# Patient Record
Sex: FEMALE | Race: OTHER | HISPANIC OR LATINO | Employment: FULL TIME | URBAN - METROPOLITAN AREA
[De-identification: names, ages, dates, MRNs, and addresses within clinical notes are randomized per-mention and may not be internally consistent; named-entity substitution may affect disease eponyms.]

---

## 2020-06-12 ENCOUNTER — OFFICE VISIT (OUTPATIENT)
Dept: OBGYN CLINIC | Facility: CLINIC | Age: 30
End: 2020-06-12

## 2020-06-12 VITALS
WEIGHT: 110 LBS | TEMPERATURE: 98 F | SYSTOLIC BLOOD PRESSURE: 109 MMHG | DIASTOLIC BLOOD PRESSURE: 70 MMHG | HEART RATE: 87 BPM | HEIGHT: 64 IN | BODY MASS INDEX: 18.78 KG/M2

## 2020-06-12 DIAGNOSIS — N63.14 BREAST LUMP ON RIGHT SIDE AT 4 O'CLOCK POSITION: Primary | ICD-10-CM

## 2020-06-12 PROCEDURE — 99203 OFFICE O/P NEW LOW 30 MIN: CPT | Performed by: NURSE PRACTITIONER

## 2020-10-05 ENCOUNTER — TELEPHONE (OUTPATIENT)
Dept: OBGYN CLINIC | Facility: CLINIC | Age: 30
End: 2020-10-05

## 2021-03-11 ENCOUNTER — TELEPHONE (OUTPATIENT)
Dept: OBGYN CLINIC | Facility: CLINIC | Age: 31
End: 2021-03-11

## 2021-04-26 ENCOUNTER — TELEPHONE (OUTPATIENT)
Dept: OBGYN CLINIC | Facility: CLINIC | Age: 31
End: 2021-04-26

## 2021-08-26 ENCOUNTER — TELEPHONE (OUTPATIENT)
Dept: OBGYN CLINIC | Facility: CLINIC | Age: 31
End: 2021-08-26

## 2024-12-23 ENCOUNTER — OFFICE VISIT (OUTPATIENT)
Dept: OBGYN CLINIC | Facility: CLINIC | Age: 34
End: 2024-12-23

## 2024-12-23 VITALS
HEIGHT: 64 IN | BODY MASS INDEX: 21.44 KG/M2 | WEIGHT: 125.6 LBS | DIASTOLIC BLOOD PRESSURE: 58 MMHG | SYSTOLIC BLOOD PRESSURE: 110 MMHG

## 2024-12-23 DIAGNOSIS — L73.8 FOLLICULITIS BARBAE: Primary | ICD-10-CM

## 2024-12-23 PROCEDURE — 99203 OFFICE O/P NEW LOW 30 MIN: CPT | Performed by: OBSTETRICS & GYNECOLOGY

## 2024-12-23 NOTE — PROGRESS NOTES
PROBLEM GYNECOLOGICAL VISIT    Trang Rutherford is a 34 y.o. female who presents today with complaint of lump on right of her vagina.  Her general medical history has been reviewed and she reports it as follows:    Past Medical History:   Diagnosis Date    Asthma     has not req'd tx since early childhood    Breast lump     R breast lump, never had follow up    Chlamydia      Past Surgical History:   Procedure Laterality Date    NO PAST SURGERIES       OB History          2    Para   0    Term   0       0    AB   2    Living   0         SAB   0    IAB   2    Ectopic   0    Multiple   0    Live Births   0               Social History     Tobacco Use    Smoking status: Never    Smokeless tobacco: Never   Vaping Use    Vaping status: Never Used   Substance Use Topics    Alcohol use: Not Currently    Drug use: Never     Social History     Substance and Sexual Activity   Sexual Activity Yes    Partners: Male    Birth control/protection: Condom Male       No current outpatient medications    History of Present Illness:   Patient present with c/o palpable lump on the right side of her vulva.    Review of Systems:  Review of Systems   Genitourinary:  Negative for menstrual problem, pelvic pain, vaginal bleeding and vaginal discharge.        Lump on right side of vulva   All other systems reviewed and are negative.      Physical Exam:  /58 (BP Location: Left arm, Patient Position: Sitting, Cuff Size: Standard)   Wt 57 kg (125 lb 9.6 oz)   LMP 2024 (Exact Date)   BMI 21.56 kg/m²   Physical Exam  Constitutional:       Appearance: Normal appearance.   Genitourinary:      Right Labia: No rash, tenderness, lesions or skin changes.     Left Labia: No tenderness, lesions, skin changes or rash.     No inguinal adenopathy present in the right or left side.  Lymphadenopathy:      Lower Body: No right inguinal adenopathy. No left inguinal adenopathy.   Neurological:      Mental Status: She is  alert.   Vitals and nursing note reviewed.       Discussion:  After examination, discuss with patient did not palpate any abnormal masses in the area.  Patient is pleased that nothing was noted.  Patient wanted assurance that everything is fine. Discuss with patient that the mass might of been folliculitis from shaving the area. Recommend using a new blade with every shave.      Assessment:   1. Normal vulva examination    Plan:   1. No intervention necessary   2. Return to office prn.   3. Patient's depression screening was assessed with a PHQ-2 score of 2. Clinically patient does not have depression. No treatment is required.      Reviewed with patient that test results are available in [facility] immediately, but that they will not necessarily be reviewed by me immediately.  Explained that I will review results at my earliest opportunity and contact patient appropriately.

## 2025-03-08 ENCOUNTER — NURSE TRIAGE (OUTPATIENT)
Dept: OTHER | Facility: OTHER | Age: 35
End: 2025-03-08

## 2025-03-08 NOTE — TELEPHONE ENCOUNTER
"FOLLOW UP: None    REASON FOR CONVERSATION: No Triage Call    SYMPTOMS: None    OTHER: Discussed with on call provider who advised to use 's gee, avoid spray tan from coming in contact with patient's skin, and keep area well ventilated. Patient made aware and verbalized understanding.     DISPOSITION: Home Care (Information or Advice Only Call)        Reason for Disposition   General information question, no triage required and triager able to answer question    Answer Assessment - Initial Assessment Questions  1. 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 Calling to ask if she should wear a special mask due to risks from DHA exposure. Patient read online that she should wear a 's mask but patient would like to verify with provider.     2. SYMPTOMS : \"Do you have any symptoms?\"         N/A    Protocols used: Information Only Call - No Triage-Adult-    "

## 2025-03-14 ENCOUNTER — HOSPITAL ENCOUNTER (EMERGENCY)
Facility: HOSPITAL | Age: 35
Discharge: HOME | End: 2025-03-14
Attending: EMERGENCY MEDICINE
Payer: COMMERCIAL

## 2025-03-14 ENCOUNTER — APPOINTMENT (EMERGENCY)
Dept: ULTRASOUND IMAGING | Facility: HOSPITAL | Age: 35
End: 2025-03-14
Payer: COMMERCIAL

## 2025-03-14 ENCOUNTER — TELEPHONE (OUTPATIENT)
Age: 35
End: 2025-03-14

## 2025-03-14 VITALS
DIASTOLIC BLOOD PRESSURE: 74 MMHG | TEMPERATURE: 98.7 F | RESPIRATION RATE: 18 BRPM | SYSTOLIC BLOOD PRESSURE: 123 MMHG | OXYGEN SATURATION: 100 % | HEART RATE: 94 BPM

## 2025-03-14 DIAGNOSIS — O20.0 THREATENED MISCARRIAGE IN EARLY PREGNANCY: Primary | ICD-10-CM

## 2025-03-14 LAB
ABO GROUP BLD: NORMAL
ANION GAP SERPL CALCULATED.3IONS-SCNC: 7 MMOL/L (ref 4–13)
B-HCG SERPL-ACNC: 3689.3 MIU/ML (ref 0–5)
BASOPHILS # BLD AUTO: 0.02 THOUSANDS/ÂΜL (ref 0–0.1)
BASOPHILS NFR BLD AUTO: 0 % (ref 0–1)
BILIRUB UR QL STRIP: NEGATIVE
BUN SERPL-MCNC: 8 MG/DL (ref 5–25)
CALCIUM SERPL-MCNC: 9.1 MG/DL (ref 8.4–10.2)
CHLORIDE SERPL-SCNC: 106 MMOL/L (ref 96–108)
CLARITY UR: CLEAR
CO2 SERPL-SCNC: 23 MMOL/L (ref 21–32)
COLOR UR: YELLOW
CREAT SERPL-MCNC: 0.49 MG/DL (ref 0.6–1.3)
EOSINOPHIL # BLD AUTO: 0.07 THOUSAND/ÂΜL (ref 0–0.61)
EOSINOPHIL NFR BLD AUTO: 1 % (ref 0–6)
ERYTHROCYTE [DISTWIDTH] IN BLOOD BY AUTOMATED COUNT: 14.5 % (ref 11.6–15.1)
EXT PREGNANCY TEST URINE: POSITIVE
EXT. CONTROL: ABNORMAL
GFR SERPL CREATININE-BSD FRML MDRD: 127 ML/MIN/1.73SQ M
GLUCOSE SERPL-MCNC: 83 MG/DL (ref 65–140)
GLUCOSE UR STRIP-MCNC: NEGATIVE MG/DL
HCT VFR BLD AUTO: 39.5 % (ref 34.8–46.1)
HGB BLD-MCNC: 12.4 G/DL (ref 11.5–15.4)
HGB UR QL STRIP.AUTO: NEGATIVE
IMM GRANULOCYTES # BLD AUTO: 0.02 THOUSAND/UL (ref 0–0.2)
IMM GRANULOCYTES NFR BLD AUTO: 0 % (ref 0–2)
KETONES UR STRIP-MCNC: NEGATIVE MG/DL
LEUKOCYTE ESTERASE UR QL STRIP: NEGATIVE
LYMPHOCYTES # BLD AUTO: 1.94 THOUSANDS/ÂΜL (ref 0.6–4.47)
LYMPHOCYTES NFR BLD AUTO: 26 % (ref 14–44)
MCH RBC QN AUTO: 25.1 PG (ref 26.8–34.3)
MCHC RBC AUTO-ENTMCNC: 31.4 G/DL (ref 31.4–37.4)
MCV RBC AUTO: 80 FL (ref 82–98)
MONOCYTES # BLD AUTO: 0.5 THOUSAND/ÂΜL (ref 0.17–1.22)
MONOCYTES NFR BLD AUTO: 7 % (ref 4–12)
NEUTROPHILS # BLD AUTO: 4.83 THOUSANDS/ÂΜL (ref 1.85–7.62)
NEUTS SEG NFR BLD AUTO: 66 % (ref 43–75)
NITRITE UR QL STRIP: NEGATIVE
NRBC BLD AUTO-RTO: 0 /100 WBCS
PH UR STRIP.AUTO: 6 [PH] (ref 4.5–8)
PLATELET # BLD AUTO: 233 THOUSANDS/UL (ref 149–390)
PMV BLD AUTO: 9.5 FL (ref 8.9–12.7)
POTASSIUM SERPL-SCNC: 3.5 MMOL/L (ref 3.5–5.3)
PROT UR STRIP-MCNC: NEGATIVE MG/DL
RBC # BLD AUTO: 4.95 MILLION/UL (ref 3.81–5.12)
RH BLD: POSITIVE
SODIUM SERPL-SCNC: 136 MMOL/L (ref 135–147)
SP GR UR STRIP.AUTO: 1.01 (ref 1–1.03)
UROBILINOGEN UR QL STRIP.AUTO: 0.2 E.U./DL
WBC # BLD AUTO: 7.38 THOUSAND/UL (ref 4.31–10.16)

## 2025-03-14 PROCEDURE — 86901 BLOOD TYPING SEROLOGIC RH(D): CPT | Performed by: EMERGENCY MEDICINE

## 2025-03-14 PROCEDURE — 81025 URINE PREGNANCY TEST: CPT | Performed by: EMERGENCY MEDICINE

## 2025-03-14 PROCEDURE — 36415 COLL VENOUS BLD VENIPUNCTURE: CPT | Performed by: EMERGENCY MEDICINE

## 2025-03-14 PROCEDURE — 86900 BLOOD TYPING SEROLOGIC ABO: CPT | Performed by: EMERGENCY MEDICINE

## 2025-03-14 PROCEDURE — 84702 CHORIONIC GONADOTROPIN TEST: CPT | Performed by: EMERGENCY MEDICINE

## 2025-03-14 PROCEDURE — 99284 EMERGENCY DEPT VISIT MOD MDM: CPT

## 2025-03-14 PROCEDURE — 80048 BASIC METABOLIC PNL TOTAL CA: CPT | Performed by: EMERGENCY MEDICINE

## 2025-03-14 PROCEDURE — 76815 OB US LIMITED FETUS(S): CPT

## 2025-03-14 PROCEDURE — 99284 EMERGENCY DEPT VISIT MOD MDM: CPT | Performed by: EMERGENCY MEDICINE

## 2025-03-14 PROCEDURE — 81003 URINALYSIS AUTO W/O SCOPE: CPT

## 2025-03-14 PROCEDURE — 85025 COMPLETE CBC W/AUTO DIFF WBC: CPT | Performed by: EMERGENCY MEDICINE

## 2025-03-14 NOTE — TELEPHONE ENCOUNTER
Patient called light spotting and some cramping . Advised patient to go to [facility]. Patient verbalized understanding and was given address.

## 2025-03-14 NOTE — ED PROVIDER NOTES
Time reflects when diagnosis was documented in both MDM as applicable and the Disposition within this note       Time User Action Codes Description Comment    3/14/2025  6:05 PM Jasvir Laguerre Add [O20.0] Threatened miscarriage in early pregnancy           ED Disposition       ED Disposition   Discharge    Condition   Stable    Date/Time   Fri Mar 14, 2025  8:26 PM    Comment   Trang Rutherford discharge to home/self care.                   Assessment & Plan       Medical Decision Making  Patient is a 30-year-old female, comes in with vaginal spotting, took pregnancy test in early March, noted to be pregnant, last period in Feb 2025, pt says she is about 6 weeks pregnancy, mild abdominal cramping, has been pregnant twice in the past with abortions, no history of ectopic pregnancy.  On exam, patient is stable, vital signs normal, abdomen soft, no focal tenderness.  Differential diagnosis: Ectopic pregnancy, threatened miscarriage, will check labs, get ultrasound pelvis.    Problems Addressed:  Threatened miscarriage in early pregnancy: acute illness or injury    Amount and/or Complexity of Data Reviewed  Labs: ordered. Decision-making details documented in ED Course.  Radiology: ordered. Decision-making details documented in ED Course.        ED Course as of 03/14/25 2223   Fri Mar 14, 2025   1735 PREGNANCY TEST URINE(!): Positive  Urine positive for pregnancy noted.   1945 HCG QUANTITATIVE(!): 3,689.3  B-HCG noted.   2025 US OB pregnancy limited with transvaginal  IMPRESSION:     Probable tiny intrauterine gestational sac, with yolk sac and embryo not visualized. Findings likely due to early pregnancy. Follow-up with hCG and ultrasound.     2027 ABO/Rh   2028 ABO Grouping: B   2028 Rh Factor: Positive  Blood type noted to be positive.   2028 Patient informed about the workup results, advised repeat hCG in 48 to 72 hours, follow-up with OB/GYN.       Medications - No data to display    ED Risk Strat Scores                                                 History of Present Illness       Chief Complaint   Patient presents with    Vaginal Bleeding     About 6 weeks preg, LMP 2/8, light pink vaginal bleeding starting today and pelvic cramping       Past Medical History:   Diagnosis Date    Asthma     has not req'd tx since early childhood    Breast lump 2013    R breast lump, never had follow up    Chlamydia 2015      Past Surgical History:   Procedure Laterality Date    NO PAST SURGERIES        Family History   Problem Relation Age of Onset    Cancer Maternal Grandfather         prostate    Breast cancer Neg Hx       Social History     Tobacco Use    Smoking status: Never    Smokeless tobacco: Never   Vaping Use    Vaping status: Never Used   Substance Use Topics    Alcohol use: Not Currently    Drug use: Never      E-Cigarette/Vaping    E-Cigarette Use Never User       E-Cigarette/Vaping Substances    Nicotine No     THC No     CBD No     Flavoring No     Other No     Unknown No       I have reviewed and agree with the history as documented.       History provided by:  Patient   used: No    Vaginal Bleeding  Quality:  Spotting  Severity:  Mild  Onset quality:  Gradual  Duration:  2 hours  Timing:  Sporadic  Progression:  Waxing and waning  Chronicity:  New  Menstrual history:  Missed period  Number of pads used:  Unable to specify  Number of tampons used:  Unable to specify  Possible pregnancy: yes    Context: spontaneously    Relieved by:  Nothing  Worsened by:  Nothing  Ineffective treatments:  None tried  Associated symptoms: no abdominal pain, no back pain, no dizziness, no dysuria, no fever and no nausea    Risk factors: no hx of ectopic pregnancy        Review of Systems   Constitutional:  Negative for chills and fever.   HENT:  Negative for facial swelling, sore throat and trouble swallowing.    Eyes:  Negative for pain and visual disturbance.   Respiratory:  Negative for cough, chest tightness  and shortness of breath.    Cardiovascular:  Negative for chest pain and leg swelling.   Gastrointestinal:  Negative for abdominal pain, diarrhea, nausea and vomiting.   Genitourinary:  Positive for vaginal bleeding. Negative for dysuria and flank pain.   Musculoskeletal:  Negative for back pain, neck pain and neck stiffness.   Skin:  Negative for pallor and rash.   Allergic/Immunologic: Negative for environmental allergies and immunocompromised state.   Neurological:  Negative for dizziness and headaches.   Hematological:  Negative for adenopathy. Does not bruise/bleed easily.   Psychiatric/Behavioral:  Negative for agitation and behavioral problems.    All other systems reviewed and are negative.          Objective       ED Triage Vitals [03/14/25 1701]   Temperature Pulse Blood Pressure Respirations SpO2 Patient Position - Orthostatic VS   98.7 °F (37.1 °C) (!) 106 136/75 18 100 % --      Temp src Heart Rate Source BP Location FiO2 (%) Pain Score    -- Monitor -- -- No Pain      Vitals      Date and Time Temp Pulse SpO2 Resp BP Pain Score FACES Pain Rating User   03/14/25 2032 -- 94 100 % -- 123/74 -- -- AS   03/14/25 1701 98.7 °F (37.1 °C) 106 100 % 18 136/75 No Pain -- KD            Physical Exam  Vitals and nursing note reviewed.   Constitutional:       General: She is not in acute distress.     Appearance: She is well-developed.   HENT:      Head: Normocephalic and atraumatic.   Eyes:      Extraocular Movements: Extraocular movements intact.   Cardiovascular:      Rate and Rhythm: Normal rate and regular rhythm.   Pulmonary:      Effort: Pulmonary effort is normal.   Abdominal:      Palpations: Abdomen is soft.      Tenderness: There is no abdominal tenderness. There is no guarding or rebound.   Musculoskeletal:         General: Normal range of motion.      Cervical back: Normal range of motion and neck supple.   Skin:     General: Skin is warm and dry.   Neurological:      General: No focal deficit present.       Mental Status: She is alert and oriented to person, place, and time.   Psychiatric:         Mood and Affect: Mood normal.         Behavior: Behavior normal.         Results Reviewed       Procedure Component Value Units Date/Time    Quantitative hCG [[id number]]  (Abnormal) Collected: 03/14/25 1754    Lab Status: Final result Specimen: Blood from Arm, Right Updated: 03/14/25 1941     HCG, Quant 3,689.3 mIU/mL     Narrative:       Expected Ranges:    HCG results between 5.0 and 25.0 mIU/mL may be indicative of early pregnancy but should be interpreted in light of the total clinical presentation.    HCG can rise to detectable levels in nehal and post menopausal women (0-11.6 mIU/mL).     Approximate               Approximate HCG  Gestation age          Concentration ( mIU/mL)  _____________          ______________________   Weeks                      HCG values  0.2-1                       5-50  1-2                           2-3                         100-5000  3-4                         500-68134  4-5                         1000-73004  5-6                         [id number]  6-8                         [id number]  8-12                        [id number]      Basic metabolic panel [[id number]]  (Abnormal) Collected: 03/14/25 1754    Lab Status: Final result Specimen: Blood from Arm, Right Updated: 03/14/25 1820     Sodium 136 mmol/L      Potassium 3.5 mmol/L      Chloride 106 mmol/L      CO2 23 mmol/L      ANION GAP 7 mmol/L      BUN 8 mg/dL      Creatinine 0.49 mg/dL      Glucose 83 mg/dL      Calcium 9.1 mg/dL      eGFR 127 ml/min/1.73sq m     Narrative:      National Kidney Disease Foundation guidelines for Chronic Kidney Disease (CKD):     Stage 1 with normal or high GFR (GFR > 90 mL/min/1.73 square meters)    Stage 2 Mild CKD (GFR = 60-89 mL/min/1.73 square meters)    Stage 3A Moderate CKD (GFR = 45-59 mL/min/1.73 square meters)    Stage 3B Moderate CKD (GFR = 30-44 mL/min/1.73 square meters)     Stage 4 Severe CKD (GFR = 15-29 mL/min/1.73 square meters)    Stage 5 End Stage CKD (GFR <15 mL/min/1.73 square meters)  Note: GFR calculation is accurate only with a steady state creatinine    CBC and differential [[id number]]  (Abnormal) Collected: 03/14/25 1754    Lab Status: Final result Specimen: Blood from Arm, Right Updated: 03/14/25 1801     WBC 7.38 Thousand/uL      RBC 4.95 Million/uL      Hemoglobin 12.4 g/dL      Hematocrit 39.5 %      MCV 80 fL      MCH 25.1 pg      MCHC 31.4 g/dL      RDW 14.5 %      MPV 9.5 fL      Platelets 233 Thousands/uL      nRBC 0 /100 WBCs      Segmented % 66 %      Immature Grans % 0 %      Lymphocytes % 26 %      Monocytes % 7 %      Eosinophils Relative 1 %      Basophils Relative 0 %      Absolute Neutrophils 4.83 Thousands/µL      Absolute Immature Grans 0.02 Thousand/uL      Absolute Lymphocytes 1.94 Thousands/µL      Absolute Monocytes 0.50 Thousand/µL      Eosinophils Absolute 0.07 Thousand/µL      Basophils Absolute 0.02 Thousands/µL     Urine Macroscopic, POC [[id number]] Collected: 03/14/25 1729    Lab Status: Final result Specimen: Urine Updated: 03/14/25 1731     Color, UA Yellow     Clarity, UA Clear     pH, UA 6.0     Leukocytes, UA Negative     Nitrite, UA Negative     Protein, UA Negative mg/dl      Glucose, UA Negative mg/dl      Ketones, UA Negative mg/dl      Urobilinogen, UA 0.2 E.U./dl      Bilirubin, UA Negative     Occult Blood, UA Negative     Specific Gravity, UA 1.010    Narrative:      CLINITEK RESULT    POCT pregnancy, urine [[id number]]  (Abnormal) Collected: 03/14/25 1728    Lab Status: Final result Updated: 03/14/25 1728     EXT Preg Test, Ur Positive     Control Valid            US OB pregnancy limited with transvaginal   Final Interpretation by Erick Chaney MD (03/14 2017)      Probable tiny intrauterine gestational sac, with yolk sac and embryo not visualized. Findings likely due to early pregnancy. Follow-up with hCG and ultrasound.          Workstation performed: [id number]             Procedures    ED Medication and Procedure Management   None     There are no discharge medications for this patient.    Outpatient Discharge Orders   hCG, quantitative   Standing Status: Future Standing Exp. Date: 05/14/26     ED SEPSIS DOCUMENTATION   Time reflects when diagnosis was documented in both MDM as applicable and the Disposition within this note       Time User Action Codes Description Comment    3/14/2025  6:05 PM Jasvir Laguerre Add [O20.0] Threatened miscarriage in early pregnancy                  Jasvir Laguerre MD  03/14/25 9565

## 2025-03-16 ENCOUNTER — HOSPITAL ENCOUNTER (EMERGENCY)
Facility: HOSPITAL | Age: 35
Discharge: HOME | End: 2025-03-16
Attending: EMERGENCY MEDICINE
Payer: COMMERCIAL

## 2025-03-16 ENCOUNTER — RESULTS FOLLOW-UP (OUTPATIENT)
Dept: EMERGENCY DEPT | Facility: HOSPITAL | Age: 35
End: 2025-03-16

## 2025-03-16 VITALS
BODY MASS INDEX: 22.48 KG/M2 | HEART RATE: 104 BPM | WEIGHT: 130.95 LBS | TEMPERATURE: 98.3 F | SYSTOLIC BLOOD PRESSURE: 122 MMHG | RESPIRATION RATE: 16 BRPM | DIASTOLIC BLOOD PRESSURE: 72 MMHG | OXYGEN SATURATION: 100 %

## 2025-03-16 DIAGNOSIS — Z01.89 ENCOUNTER FOR LABORATORY TEST: Primary | ICD-10-CM

## 2025-03-16 LAB — B-HCG SERPL-ACNC: 4613.3 MIU/ML (ref 0–5)

## 2025-03-16 PROCEDURE — 36415 COLL VENOUS BLD VENIPUNCTURE: CPT | Performed by: EMERGENCY MEDICINE

## 2025-03-16 PROCEDURE — 99281 EMR DPT VST MAYX REQ PHY/QHP: CPT

## 2025-03-16 PROCEDURE — 99283 EMERGENCY DEPT VISIT LOW MDM: CPT | Performed by: EMERGENCY MEDICINE

## 2025-03-16 PROCEDURE — 84702 CHORIONIC GONADOTROPIN TEST: CPT | Performed by: EMERGENCY MEDICINE

## 2025-03-16 NOTE — ED PROVIDER NOTES
Time reflects when diagnosis was documented in both MDM as applicable and the Disposition within this note       Time User Action Codes Description Comment    3/16/2025  2:40 PM Lillie Blum Add [Z01.89] Encounter for laboratory test           ED Disposition       ED Disposition   Discharge    Condition   Stable    Date/Time   Sun Mar 16, 2025  2:40 PM    Comment   Trang Rutherford discharge to home/self care.                   Assessment & Plan       Medical Decision Making  34 y.o. F presenting for quant hCG blood draw.    Initial considerations include early pregnancy, ectopic pregnancy, threatened miscarriage, miscarriage.    Quant hCG drawn and pt to f/u with OBGYN.    Amount and/or Complexity of Data Reviewed  Labs: ordered.        ED Course as of 03/16/25 1449   Sun Mar 16, 2025   1432 Temperature: 98.3 °F (36.8 °C)  afebrile       Medications - No data to display    ED Risk Strat Scores                            SBIRT 22yo+      Flowsheet Row Most Recent Value   Initial Alcohol Screen: US AUDIT-C     1. How often do you have a drink containing alcohol? 0 Filed at: 03/16/2025 1434   2. How many drinks containing alcohol do you have on a typical day you are drinking?  0 Filed at: 03/16/2025 1434   3a. Male UNDER 65: How often do you have five or more drinks on one occasion? 0 Filed at: 03/16/2025 1434   3b. FEMALE Any Age, or MALE 65+: How often do you have 4 or more drinks on one occassion? 0 Filed at: 03/16/2025 1434   Audit-C Score 0 Filed at: 03/16/2025 1434   RUTHANN: How many times in the past year have you...    Used an illegal drug or used a prescription medication for non-medical reasons? Never Filed at: 03/16/2025 1434                            History of Present Illness       Chief Complaint   Patient presents with    Labs Only     Pt reports told to come back in to re-check HCG. Denies any symptoms. Had HCG done 2 days ago and provider instructed her to come back for a repeat blood draw.   "      Past Medical History:   Diagnosis Date    Asthma     has not req'd tx since early childhood    Breast lump 2013    R breast lump, never had follow up    Chlamydia 2015      Past Surgical History:   Procedure Laterality Date    NO PAST SURGERIES        Family History   Problem Relation Age of Onset    Cancer Maternal Grandfather         prostate    Breast cancer Neg Hx       Social History     Tobacco Use    Smoking status: Never    Smokeless tobacco: Never   Vaping Use    Vaping status: Never Used   Substance Use Topics    Alcohol use: Not Currently    Drug use: Never      E-Cigarette/Vaping    E-Cigarette Use Never User       E-Cigarette/Vaping Substances    Nicotine No     THC No     CBD No     Flavoring No     Other No     Unknown No       I have reviewed and agree with the history as documented.     34 y.o. F p/w request for quant hCG blood draw.  Evaluated here on 3/14 for vaginal spotting/cramping in pregnancy.  Had quant of 3,689.3 and pelvic US read as \"Probable tiny intrauterine gestational sac, with yolk sac and embryo not visualized.  Findings likely due to early pregnancy.\"  Given rx for outpt quant hCG in 48-72 hours and instructed to f/u with OBGYN.  Pt presents here to have lab drawn.  Pt having some abd cramping but no longer has spotting.  Has appt for US on 4/1.      History provided by:  Patient   used: No        Review of Systems   Gastrointestinal:  Positive for abdominal pain. Negative for nausea and vomiting.   Genitourinary:  Negative for vaginal bleeding.           Objective       ED Triage Vitals [03/16/25 1422]   Temperature Pulse Blood Pressure Respirations SpO2 Patient Position - Orthostatic VS   98.3 °F (36.8 °C) 104 122/72 16 100 % --      Temp src Heart Rate Source BP Location FiO2 (%) Pain Score    -- Monitor -- -- --      Vitals      Date and Time Temp Pulse SpO2 Resp BP Pain Score FACES Pain Rating User   03/16/25 1422 98.3 °F (36.8 °C) 104 100 % 16 122/72 " -- -- JL            Physical Exam  Vitals and nursing note reviewed.   Constitutional:       General: She is not in acute distress.     Appearance: Normal appearance. She is well-developed. She is not ill-appearing, toxic-appearing or diaphoretic.   HENT:      Head: Normocephalic and atraumatic.   Eyes:      General: No scleral icterus.  Neck:      Vascular: No JVD.   Cardiovascular:      Rate and Rhythm: Normal rate and regular rhythm.      Heart sounds: Normal heart sounds. No murmur heard.  Pulmonary:      Effort: Pulmonary effort is normal. No accessory muscle usage or respiratory distress.      Breath sounds: Normal breath sounds. No stridor. No wheezing, rhonchi or rales.   Abdominal:      General: There is no distension.      Palpations: Abdomen is soft. Abdomen is not rigid. There is no mass.      Tenderness: There is no abdominal tenderness. There is no guarding or rebound. Negative signs include Orosco's sign and McBurney's sign.   Skin:     General: Skin is warm and dry.      Coloration: Skin is not jaundiced or pale.      Findings: No rash.   Neurological:      Mental Status: She is alert.      GCS: GCS eye subscore is 4. GCS verbal subscore is 5. GCS motor subscore is 6.         Results Reviewed       Procedure Component Value Units Date/Time    Quantitative hCG [[id number]] Collected: 03/16/25 1434    Lab Status: In process Specimen: Blood from Arm, Right Updated: 03/16/25 1437            No orders to display       Procedures    ED Medication and Procedure Management   None     Patient's Medications    No medications on file     No discharge procedures on file.  ED SEPSIS DOCUMENTATION   Time reflects when diagnosis was documented in both MDM as applicable and the Disposition within this note       Time User Action Codes Description Comment    3/16/2025  2:40 PM Lillie Blum Add [Z01.89] Encounter for laboratory test                  Lillie Blum DO  03/16/25 1448

## 2025-03-17 ENCOUNTER — NURSE TRIAGE (OUTPATIENT)
Dept: OTHER | Facility: OTHER | Age: 35
End: 2025-03-17

## 2025-03-17 ENCOUNTER — TELEPHONE (OUTPATIENT)
Dept: OBGYN CLINIC | Facility: CLINIC | Age: 35
End: 2025-03-17

## 2025-03-17 ENCOUNTER — NURSE TRIAGE (OUTPATIENT)
Age: 35
End: 2025-03-17

## 2025-03-17 ENCOUNTER — ULTRASOUND (OUTPATIENT)
Age: 35
End: 2025-03-17
Payer: COMMERCIAL

## 2025-03-17 VITALS
DIASTOLIC BLOOD PRESSURE: 80 MMHG | BODY MASS INDEX: 22.33 KG/M2 | HEIGHT: 64 IN | WEIGHT: 130.8 LBS | SYSTOLIC BLOOD PRESSURE: 112 MMHG

## 2025-03-17 DIAGNOSIS — O46.90 VAGINAL BLEEDING IN PREGNANCY: Primary | ICD-10-CM

## 2025-03-17 PROCEDURE — 99213 OFFICE O/P EST LOW 20 MIN: CPT | Performed by: OBSTETRICS & GYNECOLOGY

## 2025-03-17 PROCEDURE — 76817 TRANSVAGINAL US OBSTETRIC: CPT | Performed by: OBSTETRICS & GYNECOLOGY

## 2025-03-17 NOTE — TELEPHONE ENCOUNTER
"FOLLOW UP: F/U with [facility] for sooner appt.     REASON FOR CONVERSATION: Pregnancy Problem and Advice Only    SYMPTOMS: vaginal bleeding early pregnancy    OTHER: HCG with inappropriate    DISPOSITION: No disposition on file.    Trang was evaluated in ED for vaginal bleeding in early pregnancy. LMP 2/8/2025 U/S with probable tiny intrauterine gestational sac with yolk sac and embryo.  HCG repeat in 48 hours not rising appropriately. Advised by ED to f/u with OB in 1 day.    Trang contacted [facility] who scheduled her for D/V scan and 4/1.  Advised patient to contact billing department to discuss [employer] vero. Pt states currently [place] with [employer]-does not hav card.  Patient aware will check on our end and call her back with update.  If develops heavy bleeding or pelvic pain to return to ED    Contacted Tiffany glass  clerical for further assistance with insurance info and scheduling sooner appt.        Reason for Disposition   Nursing judgment    Answer Assessment - Initial Assessment Questions  1. REASON FOR CALL: \"What is the main reason for your call?\" or \"How can I best help you?\"      Schedule NP appt for pregnancy, ED f/u vaginal bleeding  2. SYMPTOMS : \"Do you have any symptoms?\"       Diffuse mild pelvic cramps    Protocols used: Information Only Call - No Triage-Adult-[place]    "

## 2025-03-17 NOTE — PROGRESS NOTES
"Name: Trang Rutherford      : 1990      MRN: [id number]  Encounter Provider: Vilma Ennis MD  Encounter Date: 3/17/2025   Encounter department: [facility] [place]  :  Assessment & Plan  Vaginal bleeding in pregnancy    Suspect early IUP based on imaging today, but not 100% definitive.   Reviewed ectopic precautions - bleeding precautions/pain precautions.   Follow up in ER with heavy bleeding or severe pain.   Increase hydration for dizziness.     Aware we can repeat labs, but decision making will likely be based more on imaging.     Increased likelihood of abnormal pregnancy in setting of abnormally rising quants.     Orders:    Progesterone; Future    hCG, quantitative; Future    AMB US OB < 14 weeks single or first gestation level 1        History of Present Illness     LMP:2/3/25 Gravid3/Para0   Two prior AB       Not planned but welcomed Took a plan B    US completed in ED 3/14/25 - possible 4mm GS, no YS seen, no adnexal masses  hCG 3/14/25 - 3,689.3            3/16/25 - 4,613.3     Light spotting Friday which has resolved/ reports cramping that radiates to her back   Some nausea/ but no vomiting   Reports vertigo and very dizzy yesterday   Blood type: B positive          Trang Rutherford is a 34 y.o. female who presents for ER follow up of bleeding in early pregnancy, abnormally rising quants.     History obtained from: patient    Review of Systems       Objective   /80   Ht 5' 4\" (1.626 m)   Wt 59.3 kg (130 lb 12.8 oz)   LMP 2025   BMI 22.45 kg/m²      Physical Exam  Vitals reviewed.   Constitutional:       Appearance: Normal appearance.   Genitourinary:     General: Normal vulva.      Labia:         Right: No rash or lesion.         Left: No rash or lesion.       Vagina: No vaginal discharge.      Uterus: Not tender.       Adnexa:         Right: No tenderness.          Left: No tenderness.     Neurological:      Mental Status: She is alert. "   Psychiatric:         Mood and Affect: Mood normal.         Behavior: Behavior normal.

## 2025-03-17 NOTE — TELEPHONE ENCOUNTER
"FOLLOW UP: Please call for follow up     REASON FOR CONVERSATION: Threatened Miscarriage    SYMPTOMS: HCG levels not doubling    OTHER: Seen in ED for HCG draw, 1 episode of light pink spotting    DISPOSITION: See PCP Within 1 Week in Office (overriding Home Care)    Reason for Disposition   Normal miscarriage (spontaneous ) symptoms    Answer Assessment - Initial Assessment Questions  1. SYMPTOM: \"What is your main concern right now?\" \"What questions do you have?\"      Patient had called in several days ago for vaginal bleeding. HCG was drawn in ER, she went back 48 hours later for followup and numbers were not doubling. She hs a follow up set for , but she is hoping to get in sooner.  She should be about 4-5 weeks pregnant, date of conception was .     2. DIAGNOSIS CONFIRMATION: \"When was the miscarriage diagnosed?\" \"By whom?\" \"Do you know how many weeks or months pregnant you were?\"      Was seen in ED   First HCG level was 3,600 and second was 4,313    3. ULTRASOUND: \"Was an ultrasound performed at that time?\" If Yes, ask: \"Do you know what it showed?\" (e.g., yes, no; empty uterus, pregnancy tissue, etc.)      She had an ultrasound in the ER, they saw a 4mm sac but it was too small to see a heartbeat     4. ABDOMEN PAIN: \"Do you have any abdomen pain?\" If present, ask: \"How bad is it?\" (e.g., Scale 1- 10; mild, moderate, or severe)      Cramping, 3/10     5. VAGINAL BLEEDING: \"Describe any bleeding that you are having.\" \"How much bleeding is there?\"      \"A little bit of blood\", had one episode of light pink bleeding and none since     7. OTHER SYMPTOMS: \"Are there any other symptoms?\" (e.g., dizziness)      Reports vertigo earlier today    Protocols used:  - Spontaneous Miscarriage Follow-up Call-Adult-    "

## 2025-03-17 NOTE — TELEPHONE ENCOUNTER
Regarding: np- ob ed follow up cramping lmp 2/8/25  ----- Message from Eveline JANSEN sent at 3/17/2025 10:05 AM EDT -----  New ob patient coming from [facility]. Patient is having cramping and was seen in the ED over the weekend for cramping some vaginal bleeding.     Lmp 2/8/2025    Tried CTS    Wants to establish care at [facility]

## 2025-03-18 ENCOUNTER — APPOINTMENT (OUTPATIENT)
Dept: LAB | Facility: HOSPITAL | Age: 35
End: 2025-03-18
Payer: COMMERCIAL

## 2025-03-18 DIAGNOSIS — O46.90 VAGINAL BLEEDING IN PREGNANCY: ICD-10-CM

## 2025-03-18 LAB
B-HCG SERPL-ACNC: 7398.5 MIU/ML (ref 0–5)
PROGEST SERPL-MCNC: 22.83 NG/ML

## 2025-03-18 PROCEDURE — 36415 COLL VENOUS BLD VENIPUNCTURE: CPT

## 2025-03-18 PROCEDURE — 84702 CHORIONIC GONADOTROPIN TEST: CPT

## 2025-03-18 PROCEDURE — 84144 ASSAY OF PROGESTERONE: CPT

## 2025-03-20 ENCOUNTER — TELEPHONE (OUTPATIENT)
Age: 35
End: 2025-03-20

## 2025-03-20 NOTE — TELEPHONE ENCOUNTER
Called patient to review results and recommendations. Patient verbalized understanding and is thankful.

## 2025-03-27 ENCOUNTER — ULTRASOUND (OUTPATIENT)
Age: 35
End: 2025-03-27

## 2025-03-27 VITALS
BODY MASS INDEX: 22.26 KG/M2 | WEIGHT: 130.4 LBS | SYSTOLIC BLOOD PRESSURE: 110 MMHG | HEIGHT: 64 IN | DIASTOLIC BLOOD PRESSURE: 80 MMHG

## 2025-03-27 DIAGNOSIS — O46.90 VAGINAL BLEEDING IN PREGNANCY: Primary | ICD-10-CM

## 2025-03-28 NOTE — PROGRESS NOTES
"Name: Trang Rutherford      : 1990      MRN: [id number]  Encounter Provider: Vilma Ennis MD  Encounter Date: 3/27/2025   Encounter department: [facility] [place] [facility]  :  Assessment & Plan  Vaginal bleeding in pregnancy  Further development noted from prior US, but still suspicious for failed IUP.   Will RTO in 1 week for repeat US.   Precautions reviewed.     Orders:    AMB US OB < 14 weeks single or first gestation level 1        History of Present Illness   HPI  Trang Rutherford is a 34 y.o. female who presents for follow up ultrasound.   No further bleeding.   + nausea.     History obtained from: patient    Review of Systems       Objective   /80   Ht 5' 4\" (1.626 m)   Wt 59.1 kg (130 lb 6.4 oz)   LMP 2025   BMI 22.38 kg/m²      Physical Exam      "

## 2025-03-31 ENCOUNTER — TELEPHONE (OUTPATIENT)
Age: 35
End: 2025-03-31

## 2025-04-04 ENCOUNTER — TELEPHONE (OUTPATIENT)
Age: 35
End: 2025-04-04

## 2025-04-04 ENCOUNTER — ULTRASOUND (OUTPATIENT)
Age: 35
End: 2025-04-04
Payer: MEDICARE

## 2025-04-04 VITALS
HEIGHT: 64 IN | BODY MASS INDEX: 22.53 KG/M2 | DIASTOLIC BLOOD PRESSURE: 72 MMHG | WEIGHT: 132 LBS | SYSTOLIC BLOOD PRESSURE: 136 MMHG

## 2025-04-04 DIAGNOSIS — O46.90 VAGINAL BLEEDING IN PREGNANCY: Primary | ICD-10-CM

## 2025-04-04 DIAGNOSIS — O02.0 ANEMBRYONIC PREGNANCY: Primary | ICD-10-CM

## 2025-04-04 PROCEDURE — 99214 OFFICE O/P EST MOD 30 MIN: CPT | Performed by: OBSTETRICS & GYNECOLOGY

## 2025-04-04 NOTE — TELEPHONE ENCOUNTER
Pt called regarding D&V appt today with Dr. Óscar Brown. Pt has questions regarding sac measurements. Provider note does not appear to be available yet. Pt is also requesting additional HCG lab be placed if indicated by provider. Please advise. Thank you.

## 2025-04-04 NOTE — TELEPHONE ENCOUNTER
Pt called in had an US today and thinks she left the US pictures in the room on top of the machine. I spoke with Bettina who stated the provider is in the room with another pt so they will give the pt a call back as soon as provider is done with the other pt.

## 2025-04-07 ENCOUNTER — APPOINTMENT (OUTPATIENT)
Dept: LAB | Facility: CLINIC | Age: 35
End: 2025-04-07
Payer: MEDICARE

## 2025-04-07 DIAGNOSIS — O46.90 VAGINAL BLEEDING IN PREGNANCY: ICD-10-CM

## 2025-04-07 LAB — B-HCG SERPL-ACNC: ABNORMAL MIU/ML (ref 0–5)

## 2025-04-07 PROCEDURE — 36415 COLL VENOUS BLD VENIPUNCTURE: CPT

## 2025-04-07 PROCEDURE — 84702 CHORIONIC GONADOTROPIN TEST: CPT

## 2025-04-07 NOTE — TELEPHONE ENCOUNTER
Pt is requesting to view Dr. Cantrell's note from her visit on 4/4 and US before she schedules D&C. Note is currently not available. Please follow up with pt.

## 2025-04-07 NOTE — PROGRESS NOTES
"Assessment/Plan:    Missed  (anembryonic pregnancy)    - reviewed in detail what changes should be seen at this point on sono (1 mm per day, yolk sac with fetal pole, + cardiac activity)  - offered support and reconfirmation sono prior to final decision  - advised cytotec; can do D&E if chooses so    Subjective      Trang Rutherford is a 34 y.o.  LMP 2/3/2025 who presents for third sono s/p pregnancy conceived despite Plan B.  No VB.      Cycle length: N/A  Pregnancy testing:    Latest Reference Range & Units 25 17:54 25 14:34 25 15:42   HCG QUANTITATIVE 0 - 5 mIU/mL 3,689.3 (H) 4,613.3 (H) 7,398.5 (H)     Pregnancy imaging:   3/17/2025 gestation sac 0.5 x 0.5 cm, out-of range   3/27/2025 gestational sac 1.12 cm, 5w 2d     Blood type: B positive.  Other lab results: progesterone 22    The following portions of the patient's history were reviewed and updated as appropriate: allergies, current medications, past family history, past medical history, past social history, past surgical history, and problem list.    Review of Systems  Pertinent items are noted in HPI.     Objective      /72 (BP Location: Left arm, Patient Position: Sitting, Cuff Size: Large)   Ht 5' 4\" (1.626 m)   Wt 59.9 kg (132 lb)   LMP 2025   BMI 22.66 kg/m²     General: alert and oriented, in no acute distress   Heart: regular rate and rhythm   Lungs: Effort normal   Abdomen: soft, non-tender, without masses or organomegaly   Vulva: normal                     Imaging:    Gestational Sac:  1.14 x 1.43 x 1.34 cm (5w4d)  ? Collapsed Yolk/Fetal Pole: 0.28 cm (5w 6d)  No cardiac activity          "

## 2025-04-08 ENCOUNTER — RESULTS FOLLOW-UP (OUTPATIENT)
Age: 35
End: 2025-04-08

## 2025-04-11 ENCOUNTER — TELEPHONE (OUTPATIENT)
Age: 35
End: 2025-04-11

## 2025-04-11 NOTE — TELEPHONE ENCOUNTER
Called patient and explained to her what she was doing to the access center and she is to call back so we can schedule a D&V rescan (ultrasound) with Dr. Allen or Dr. Cantrell.

## 2025-04-11 NOTE — TELEPHONE ENCOUNTER
Patient returned call, s/w Bettina in office clarifying info. Pt needs a d&v rescan per Dr Rivas.     Offered 4/16, pt declined requesting sooner. Bettina offered 4/15 at 8:30 however does not display in DT; pt agreeable.   Office will book pt. Pt interested in a possible referral to review further what went wrong (possible genetic counseling or preconception depending on recommendation).

## 2025-04-14 ENCOUNTER — TELEPHONE (OUTPATIENT)
Age: 35
End: 2025-04-14

## 2025-04-14 NOTE — TELEPHONE ENCOUNTER
PT called in regarding wanting to see someone else for a second opinion before having to get . She understands everything that was gone over with her before but wants someone else to look as well. PT stated she was looking to see a DO and asked about Dr Garcia but advised she is GYN only. Please follow up.

## 2025-04-14 NOTE — TELEPHONE ENCOUNTER
Called patient x2 and no answer. LMOM to call the office so we can discuss if she would like to be seen by someone in the office and if she is still interested in getting another ultrasound since it was not scheduled yet. Provided call back number and if patient would like to discuss directly can transfer to me.

## 2025-04-14 NOTE — TELEPHONE ENCOUNTER
Attempted another call to the patient. Still no Answer. Provided the call back number to discuss plan going forward and if she would like to schedule an appt

## 2025-04-21 ENCOUNTER — HOSPITAL ENCOUNTER (EMERGENCY)
Facility: HOSPITAL | Age: 35
Discharge: HOME | End: 2025-04-21
Attending: EMERGENCY MEDICINE
Payer: MEDICARE

## 2025-04-21 ENCOUNTER — APPOINTMENT (EMERGENCY)
Dept: ULTRASOUND IMAGING | Facility: HOSPITAL | Age: 35
End: 2025-04-21
Payer: MEDICARE

## 2025-04-21 VITALS
HEART RATE: 98 BPM | RESPIRATION RATE: 16 BRPM | DIASTOLIC BLOOD PRESSURE: 74 MMHG | BODY MASS INDEX: 22.66 KG/M2 | TEMPERATURE: 98 F | SYSTOLIC BLOOD PRESSURE: 125 MMHG | OXYGEN SATURATION: 99 % | WEIGHT: 132 LBS

## 2025-04-21 DIAGNOSIS — O03.9 MISCARRIAGE: Primary | ICD-10-CM

## 2025-04-21 LAB
ANION GAP SERPL CALCULATED.3IONS-SCNC: 9 MMOL/L (ref 4–13)
B-HCG SERPL-ACNC: 6139.6 MIU/ML (ref 0–5)
BASOPHILS # BLD AUTO: 0.02 THOUSANDS/ÂΜL (ref 0–0.1)
BASOPHILS NFR BLD AUTO: 0 % (ref 0–1)
BUN SERPL-MCNC: 5 MG/DL (ref 5–25)
CALCIUM SERPL-MCNC: 9.7 MG/DL (ref 8.4–10.2)
CHLORIDE SERPL-SCNC: 102 MMOL/L (ref 96–108)
CO2 SERPL-SCNC: 25 MMOL/L (ref 21–32)
CREAT SERPL-MCNC: 0.51 MG/DL (ref 0.6–1.3)
EOSINOPHIL # BLD AUTO: 0.08 THOUSAND/ÂΜL (ref 0–0.61)
EOSINOPHIL NFR BLD AUTO: 1 % (ref 0–6)
ERYTHROCYTE [DISTWIDTH] IN BLOOD BY AUTOMATED COUNT: 13.5 % (ref 11.6–15.1)
GFR SERPL CREATININE-BSD FRML MDRD: 125 ML/MIN/1.73SQ M
GLUCOSE SERPL-MCNC: 97 MG/DL (ref 65–140)
HCT VFR BLD AUTO: 38.7 % (ref 34.8–46.1)
HGB BLD-MCNC: 12.5 G/DL (ref 11.5–15.4)
IMM GRANULOCYTES # BLD AUTO: 0.02 THOUSAND/UL (ref 0–0.2)
IMM GRANULOCYTES NFR BLD AUTO: 0 % (ref 0–2)
LYMPHOCYTES # BLD AUTO: 2.02 THOUSANDS/ÂΜL (ref 0.6–4.47)
LYMPHOCYTES NFR BLD AUTO: 18 % (ref 14–44)
MCH RBC QN AUTO: 25.8 PG (ref 26.8–34.3)
MCHC RBC AUTO-ENTMCNC: 32.3 G/DL (ref 31.4–37.4)
MCV RBC AUTO: 80 FL (ref 82–98)
MONOCYTES # BLD AUTO: 0.6 THOUSAND/ÂΜL (ref 0.17–1.22)
MONOCYTES NFR BLD AUTO: 6 % (ref 4–12)
NEUTROPHILS # BLD AUTO: 8.21 THOUSANDS/ÂΜL (ref 1.85–7.62)
NEUTS SEG NFR BLD AUTO: 75 % (ref 43–75)
NRBC BLD AUTO-RTO: 0 /100 WBCS
PLATELET # BLD AUTO: 217 THOUSANDS/UL (ref 149–390)
PMV BLD AUTO: 9.6 FL (ref 8.9–12.7)
POTASSIUM SERPL-SCNC: 3.3 MMOL/L (ref 3.5–5.3)
RBC # BLD AUTO: 4.85 MILLION/UL (ref 3.81–5.12)
SODIUM SERPL-SCNC: 136 MMOL/L (ref 135–147)
WBC # BLD AUTO: 10.95 THOUSAND/UL (ref 4.31–10.16)

## 2025-04-21 PROCEDURE — 99284 EMERGENCY DEPT VISIT MOD MDM: CPT

## 2025-04-21 PROCEDURE — 88305 TISSUE EXAM BY PATHOLOGIST: CPT | Performed by: PATHOLOGY

## 2025-04-21 PROCEDURE — 80048 BASIC METABOLIC PNL TOTAL CA: CPT

## 2025-04-21 PROCEDURE — 76856 US EXAM PELVIC COMPLETE: CPT

## 2025-04-21 PROCEDURE — 96366 THER/PROPH/DIAG IV INF ADDON: CPT

## 2025-04-21 PROCEDURE — 99244 OFF/OP CNSLTJ NEW/EST MOD 40: CPT | Performed by: OBSTETRICS & GYNECOLOGY

## 2025-04-21 PROCEDURE — 76830 TRANSVAGINAL US NON-OB: CPT

## 2025-04-21 PROCEDURE — 96365 THER/PROPH/DIAG IV INF INIT: CPT

## 2025-04-21 PROCEDURE — 36415 COLL VENOUS BLD VENIPUNCTURE: CPT

## 2025-04-21 PROCEDURE — 85025 COMPLETE CBC W/AUTO DIFF WBC: CPT

## 2025-04-21 PROCEDURE — 84702 CHORIONIC GONADOTROPIN TEST: CPT

## 2025-04-21 PROCEDURE — 96368 THER/DIAG CONCURRENT INF: CPT

## 2025-04-21 RX ORDER — ACETAMINOPHEN 10 MG/ML
1000 INJECTION, SOLUTION INTRAVENOUS ONCE
Status: COMPLETED | OUTPATIENT
Start: 2025-04-21 | End: 2025-04-21

## 2025-04-21 RX ADMIN — SODIUM CHLORIDE, SODIUM LACTATE, POTASSIUM CHLORIDE, AND CALCIUM CHLORIDE 1000 ML: .6; .31; .03; .02 INJECTION, SOLUTION INTRAVENOUS at 13:42

## 2025-04-21 RX ADMIN — ACETAMINOPHEN 1000 MG: 10 INJECTION INTRAVENOUS at 15:15

## 2025-04-21 NOTE — ED PROVIDER NOTES
Time reflects when diagnosis was documented in both MDM as applicable and the Disposition within this note       Time User Action Codes Description Comment    2025  4:11 PM Ari Valderrama Add [O03.9] Miscarriage           ED Disposition       ED Disposition   Discharge    Condition   Stable    Date/Time     4:39 PM    Comment   Trang Rutherford discharge to home/self care.                   Assessment & Plan       Medical Decision Making  DDx including but not limited to: threatened , incomplete , anemia, retained products of conception,     Pt likely miscarried this past weekend and is still passing POC. Hgb is stable. She is B positive so did not require rhogam. OB did see pt- see separate note.     I have discussed the plan to discharge pt from ED. The patient was discharged in stable condition.  Patient ambulated off the department.  Extensive return to emergency department precautions were discussed.  Follow up with appropriate providers including primary care physician was discussed.  Patient and/or their  primary decision maker expressed understanding.  Patient remained stable during entire emergency department stay.      Amount and/or Complexity of Data Reviewed  Labs: ordered. Decision-making details documented in ED Course.  Radiology: ordered. Decision-making details documented in ED Course.    Risk  Prescription drug management.        ED Course as of 25 1641      1328 Per chart review pt is B positive- does not need rhogam    1442 HCG QUANTITATIVE(!): 6,139.6  >20,000 2 weeks ago    1459 Hemoglobin: 12.5  stable   1546 US pelvis complete w transvaginal  1.  Clot and/or retained products of conception within the endometrial cavity.     2.  Normal ovaries.     1612 OB will be coming to see pt    1638 Per OB okay to d/c home       Medications   lactated ringers bolus 1,000 mL (1,000 mL Intravenous New Bag 25 1342)   acetaminophen (Ofirmev)  injection 1,000 mg (0 mg Intravenous Stopped 4/21/25 1531)       ED Risk Strat Scores                    No data recorded        SBIRT 22yo+      Flowsheet Row Most Recent Value   Initial Alcohol Screen: US AUDIT-C     1. How often do you have a drink containing alcohol? 0 Filed at: 04/21/2025 1319   2. How many drinks containing alcohol do you have on a typical day you are drinking?  0 Filed at: 04/21/2025 1319   3a. Male UNDER 65: How often do you have five or more drinks on one occasion? 0 Filed at: 04/21/2025 1319   3b. FEMALE Any Age, or MALE 65+: How often do you have 4 or more drinks on one occassion? 0 Filed at: 04/21/2025 1319   Audit-C Score 0 Filed at: 04/21/2025 1319   RUTHANN: How many times in the past year have you...    Used an illegal drug or used a prescription medication for non-medical reasons? Never Filed at: 04/21/2025 1319                            History of Present Illness       Chief Complaint   Patient presents with    Threatened Miscarriage     Pt reports she was scheduled for a d&c tomorrow for a miscarriage, started bleeding on 4/18 but pt reports pain has worsened. Pt reports bleeding is not super heavy but reports large blood clots.        Past Medical History:   Diagnosis Date    Asthma     has not req'd tx since early childhood    Breast lump 2013    R breast lump, never had follow up    Chlamydia 2015      Past Surgical History:   Procedure Laterality Date    NO PAST SURGERIES        Family History   Problem Relation Age of Onset    Cancer Maternal Grandfather         prostate    Breast cancer Neg Hx       Social History     Tobacco Use    Smoking status: Never    Smokeless tobacco: Never   Vaping Use    Vaping status: Never Used   Substance Use Topics    Alcohol use: Not Currently    Drug use: Never      E-Cigarette/Vaping    E-Cigarette Use Never User       E-Cigarette/Vaping Substances    Nicotine No     THC No     CBD No     Flavoring No     Other No     Unknown No       I have  reviewed and agree with the history as documented.     34 YOF with PMH asthma presents today due to vaginal bleeding and cramping starting this past weekend. Pt reports that she had miscarriage Saturday night into Sunday- passed large amount of blood clots. Reports that she was scheduled for D&V tomorrow at [facility]. Pt reports that her pregnancy was not progressing which is why the D&V was scheduled. However today she reports that the pain was 10/10 and she could not tolerate it at home and wanted to come in. Bleeding has slowed down since the weekend.        Review of Systems   Gastrointestinal:  Negative for constipation, diarrhea, nausea and vomiting.   Genitourinary:  Positive for pelvic pain and vaginal bleeding.   Neurological:  Positive for weakness.           Objective       ED Triage Vitals   Temperature Pulse Blood Pressure Respirations SpO2 Patient Position - Orthostatic VS   04/21/25 1308 04/21/25 1308 04/21/25 1308 04/21/25 1308 04/21/25 1308 04/21/25 1308   98 °F (36.7 °C) 105 153/91 18 99 % Sitting      Temp Source Heart Rate Source BP Location FiO2 (%) Pain Score    04/21/25 1308 04/21/25 1308 04/21/25 1308 -- 04/21/25 1518    Oral Monitor Right arm  5      Vitals      Date and Time Temp Pulse SpO2 Resp BP Pain Score FACES Pain Rating User   04/21/25 1530 -- 98 99 % 16 125/74 -- -- SL   04/21/25 1518 -- 105 99 % 16 125/75 5 -- SL   04/21/25 1310 -- -- -- -- 135/92 -- -- JS   04/21/25 1308 98 °F (36.7 °C) 105 99 % 18 153/91 -- -- JS            Physical Exam  Vitals and nursing note reviewed.   Constitutional:       General: She is not in acute distress.     Appearance: Normal appearance. She is well-developed.   HENT:      Head: Normocephalic and atraumatic.   Eyes:      Conjunctiva/sclera: Conjunctivae normal.   Cardiovascular:      Rate and Rhythm: Regular rhythm. Tachycardia present.      Heart sounds: No murmur heard.  Pulmonary:      Effort: Pulmonary effort is normal. No respiratory distress.       Breath sounds: Normal breath sounds.   Abdominal:      Palpations: Abdomen is soft.      Tenderness: There is abdominal tenderness (suprapubic).   Musculoskeletal:         General: No swelling.      Cervical back: Neck supple.   Skin:     General: Skin is warm and dry.   Neurological:      Mental Status: She is alert.   Psychiatric:         Mood and Affect: Mood normal.         Behavior: Behavior normal.         Results Reviewed       Procedure Component Value Units Date/Time    hCG, quantitative [[id number]]  (Abnormal) Collected: 04/21/25 1341    Lab Status: Final result Specimen: Blood from Arm, Left Updated: 04/21/25 1441     HCG, Quant 6,139.6 mIU/mL     Narrative:       Expected Ranges:    HCG results between 5.0 and 25.0 mIU/mL may be indicative of early pregnancy but should be interpreted in light of the total clinical presentation.    HCG can rise to detectable levels in nehal and post menopausal women (0-11.6 mIU/mL).     Approximate               Approximate HCG  Gestation age          Concentration ( mIU/mL)  _____________          ______________________   Weeks                      HCG values  0.2-1                       5-50  1-2                           2-3                         100-5000  3-4                         500-93409  4-5                         1000-22907  5-6                         [id number]  6-8                         13352-369557  8-12                        [id number]      Basic metabolic panel [[id number]]  (Abnormal) Collected: 04/21/25 1341    Lab Status: Final result Specimen: Blood from Arm, Left Updated: 04/21/25 1418     Sodium 136 mmol/L      Potassium 3.3 mmol/L      Chloride 102 mmol/L      CO2 25 mmol/L      ANION GAP 9 mmol/L      BUN 5 mg/dL      Creatinine 0.51 mg/dL      Glucose 97 mg/dL      Calcium 9.7 mg/dL      eGFR 125 ml/min/1.73sq m     Narrative:      National Kidney Disease Foundation guidelines for Chronic Kidney Disease (CKD):     Stage 1 with  normal or high GFR (GFR > 90 mL/min/1.73 square meters)    Stage 2 Mild CKD (GFR = 60-89 mL/min/1.73 square meters)    Stage 3A Moderate CKD (GFR = 45-59 mL/min/1.73 square meters)    Stage 3B Moderate CKD (GFR = 30-44 mL/min/1.73 square meters)    Stage 4 Severe CKD (GFR = 15-29 mL/min/1.73 square meters)    Stage 5 End Stage CKD (GFR <15 mL/min/1.73 square meters)  Note: GFR calculation is accurate only with a steady state creatinine    CBC and differential [[id number]]  (Abnormal) Collected: 04/21/25 1341    Lab Status: Final result Specimen: Blood from Arm, Left Updated: 04/21/25 1357     WBC 10.95 Thousand/uL      RBC 4.85 Million/uL      Hemoglobin 12.5 g/dL      Hematocrit 38.7 %      MCV 80 fL      MCH 25.8 pg      MCHC 32.3 g/dL      RDW 13.5 %      MPV 9.6 fL      Platelets 217 Thousands/uL      nRBC 0 /100 WBCs      Segmented % 75 %      Immature Grans % 0 %      Lymphocytes % 18 %      Monocytes % 6 %      Eosinophils Relative 1 %      Basophils Relative 0 %      Absolute Neutrophils 8.21 Thousands/µL      Absolute Immature Grans 0.02 Thousand/uL      Absolute Lymphocytes 2.02 Thousands/µL      Absolute Monocytes 0.60 Thousand/µL      Eosinophils Absolute 0.08 Thousand/µL      Basophils Absolute 0.02 Thousands/µL             US pelvis complete w transvaginal   Final Interpretation by Yunior Wiseman MD (04/21 9579)      1.  Clot and/or retained products of conception within the endometrial cavity.      2.  Normal ovaries.               Resident: ALEM Becker I, the attending radiologist, have reviewed the images and agree with the final report above.         Workstation performed: [id number]             Procedures    ED Medication and Procedure Management   Prior to Admission Medications   Prescriptions Last Dose Informant Patient Reported? Taking?   Prenatal MV-Min-Fe Fum-FA-DHA (PRENATAL+DHA PO)  Self Yes No   Sig: Take by mouth      Facility-Administered Medications: None     Patient's Medications    Discharge Prescriptions    No medications on file     No discharge procedures on file.  ED SEPSIS DOCUMENTATION   Time reflects when diagnosis was documented in both MDM as applicable and the Disposition within this note       Time User Action Codes Description Comment    4/21/2025  4:11 PM Ari Valderrama Add [O03.9] Miscarriage                  Ari Valderrama PA-C  04/21/25 2293

## 2025-04-21 NOTE — ASSESSMENT & PLAN NOTE
"Trang is presenting with vaginal bleeding and cramping consistent with a spontaneous miscarriage.   On speculum exam, she is noted to have tissue at the external os, which was easily removed.   TVUS showed \"Clot and/or retained products of conception within the endometrial cavity\". Most of which likely passed as exam was after TVUS.   Quant appropriately downtrending.   Hcg 20,895 on 4/7/2025 --> 6,139 on 4/21/2025  Discussed continued expectant management vs medical/surgical management. She has opted for expectant management.   Reviewed strict return precautions (heavy bleeding, pain, syncope, signs of infection, etc.).   Recommend outpatient follow up in 2-3 weeks.   "

## 2025-04-21 NOTE — CONSULTS
"Consult - OB/GYN   Trang Rutherford 34 y.o. female MRN: [id number]  Unit/Bed#: ED-03 Encounter: [id number]    Assessment:   34 y.o.  sexually active reproductive aged female with a spontaneous miscarriage.    Plan:   Spontaneous miscarriage  Assessment & Plan  Trang is presenting with vaginal bleeding and cramping consistent with a spontaneous miscarriage.   On speculum exam, she is noted to have tissue at the external os, which was easily removed.   TVUS showed \"Clot and/or retained products of conception within the endometrial cavity\". Most of which likely passed as exam was after TVUS.   Quant appropriately downtrending.   Hcg 20,895 on 2025 --> 6,139 on 2025  Discussed continued expectant management vs medical/surgical management. She has opted for expectant management.   Reviewed strict return precautions (heavy bleeding, pain, syncope, signs of infection, etc.).   Recommend outpatient follow up in 2-3 weeks.       Discussed case and plan w/ Dr. Toussaint-Foster    HPI:  Trang is a 35 yo  at approximately 9w gestation, presenting with abdominal pain and vaginal bleeding. She [facility] and was found to have a missed . She states that she opted for expectant management, but has a scheduled D&E on 2025.    On Saturday and  she was having heavy vaginal bleeding and cramping. She passed large clots, which improved. Today she complained of significant 10 out of 10 pain prior to arrival. She states that her pain improved after she passed a large clot in the bathroom, after her pelvic US.     Active Problems:  There is no problem list on file for this patient.    PMH:  Past Medical History:   Diagnosis Date    Asthma     has not req'd tx since early childhood    Breast lump     R breast lump, never had follow up    Chlamydia        PSH:  Past Surgical History:   Procedure Laterality Date    NO PAST SURGERIES   "       OB History  OB History    Para Term  AB Living   3 0 0 0 2 0   SAB IAB Ectopic Multiple Live Births   0 2 0 0 0      # Outcome Date GA Lbr Tino/2nd Weight Sex Type Anes PTL Lv   3 Current            2 IAB            1 IAB              Meds:  No current facility-administered medications on file prior to encounter.     Current Outpatient Medications on File Prior to Encounter   Medication Sig Dispense Refill    Prenatal MV-Min-Fe Fum-FA-DHA (PRENATAL+DHA PO) Take by mouth         Allergies:  Allergies   Allergen Reactions    Fish Oil - Food Allergy Hives and Rash     Physical Exam:  /74 (BP Location: Right arm)   Pulse 98   Temp 98 °F (36.7 °C) (Oral)   Resp 16   Wt 59.9 kg (132 lb)   LMP 2025   SpO2 99%   BMI 22.66 kg/m²     Physical Exam  Vitals reviewed.   Constitutional:       Appearance: Normal appearance.   HENT:      Head: Normocephalic and atraumatic.   Eyes:      Extraocular Movements: Extraocular movements intact.   Cardiovascular:      Rate and Rhythm: Normal rate.      Pulses: Normal pulses.   Pulmonary:      Effort: Pulmonary effort is normal. No respiratory distress.   Abdominal:      Palpations: Abdomen is soft.      Tenderness: There is no abdominal tenderness.   Genitourinary:     General: Normal vulva.      Comments: Normal appearing external genitalia, dilated nulliparous cervix with visible tissue at the os, mild dark blood in posterior vaginal vault.   Musculoskeletal:         General: Normal range of motion.      Cervical back: Normal range of motion.   Skin:     General: Skin is warm and dry.   Neurological:      Mental Status: She is alert.   Psychiatric:         Mood and Affect: Mood normal.         Behavior: Behavior normal.         Pita Zuniga MD  OBGYN PGY-4  25 4:31 PM

## 2025-04-25 PROCEDURE — 88305 TISSUE EXAM BY PATHOLOGIST: CPT | Performed by: PATHOLOGY
